# Patient Record
Sex: MALE | Race: WHITE | NOT HISPANIC OR LATINO
[De-identification: names, ages, dates, MRNs, and addresses within clinical notes are randomized per-mention and may not be internally consistent; named-entity substitution may affect disease eponyms.]

---

## 2017-01-04 ENCOUNTER — CHARTING TRANS (OUTPATIENT)
Dept: OTHER | Age: 77
End: 2017-01-04

## 2017-03-06 ENCOUNTER — CHARTING TRANS (OUTPATIENT)
Dept: OTHER | Age: 77
End: 2017-03-06

## 2017-03-16 ENCOUNTER — CHARTING TRANS (OUTPATIENT)
Dept: OTHER | Age: 77
End: 2017-03-16

## 2019-01-10 ENCOUNTER — OFFICE VISIT (OUTPATIENT)
Dept: URBAN - METROPOLITAN AREA CLINIC 18 | Facility: CLINIC | Age: 79
End: 2019-01-10
Payer: COMMERCIAL

## 2019-01-10 DIAGNOSIS — H02.831 DERMATOCHALASIS OF RIGHT UPPER EYELID: ICD-10-CM

## 2019-01-10 DIAGNOSIS — R42 VERTIGO: ICD-10-CM

## 2019-01-10 DIAGNOSIS — H02.834 DERMATOCHALASIS OF LEFT UPPER EYELID: ICD-10-CM

## 2019-01-10 PROCEDURE — 92004 COMPRE OPH EXAM NEW PT 1/>: CPT | Performed by: OPTOMETRIST

## 2019-01-10 RX ORDER — ASPIRIN 81 MG/1
81 MG TABLET, CHEWABLE ORAL
Qty: 0 | Refills: 0 | Status: ACTIVE
Start: 2019-01-10

## 2019-01-10 ASSESSMENT — INTRAOCULAR PRESSURE
OS: 16
OD: 14

## 2019-01-10 ASSESSMENT — KERATOMETRY
OD: 42.63
OS: 234.13

## 2019-01-10 NOTE — IMPRESSION/PLAN
Impression: Dermatochalasis of right upper eyelid: H02.831. Plan: Discussed diagnosis in detail with patient. Advised patient of condition. No treatment is required at this time. Will continue to observe condition and or symptoms.  Pt can schedule appt with Dr Castaneda when he is ready

## 2019-01-10 NOTE — IMPRESSION/PLAN
Impression: Vertigo: R42. Condition: established, worsening. Plan: Discussed condition with pt today. Explained Vertigo does not seem to be associated with eyes. OU look healthy and are doing well. NO double vision noticed on exam today.  Recommend keeping appt with Neurologist

## 2020-01-21 ENCOUNTER — OFFICE VISIT (OUTPATIENT)
Dept: URBAN - METROPOLITAN AREA CLINIC 18 | Facility: CLINIC | Age: 80
End: 2020-01-21
Payer: COMMERCIAL

## 2020-01-21 PROCEDURE — 92014 COMPRE OPH EXAM EST PT 1/>: CPT | Performed by: OPTOMETRIST

## 2020-01-21 ASSESSMENT — INTRAOCULAR PRESSURE
OD: 14
OS: 15

## 2020-01-21 ASSESSMENT — KERATOMETRY
OD: 43.00
OS: 43.25

## 2021-12-16 ENCOUNTER — OFFICE VISIT (OUTPATIENT)
Dept: URBAN - METROPOLITAN AREA CLINIC 18 | Facility: CLINIC | Age: 81
End: 2021-12-16
Payer: COMMERCIAL

## 2021-12-16 DIAGNOSIS — H04.123 TEAR FILM INSUFFICIENCY OF BILATERAL LACRIMAL GLANDS: ICD-10-CM

## 2021-12-16 DIAGNOSIS — H25.13 AGE-RELATED NUCLEAR CATARACT, BILATERAL: Primary | ICD-10-CM

## 2021-12-16 PROCEDURE — 99213 OFFICE O/P EST LOW 20 MIN: CPT | Performed by: OPTOMETRIST

## 2021-12-16 ASSESSMENT — INTRAOCULAR PRESSURE
OS: 16
OD: 15

## 2023-02-08 ENCOUNTER — OFFICE VISIT (OUTPATIENT)
Dept: URBAN - METROPOLITAN AREA CLINIC 18 | Facility: CLINIC | Age: 83
End: 2023-02-08
Payer: COMMERCIAL

## 2023-02-08 DIAGNOSIS — H02.834 DERMATOCHALASIS OF LEFT UPPER EYELID: ICD-10-CM

## 2023-02-08 DIAGNOSIS — H04.123 TEAR FILM INSUFFICIENCY OF BILATERAL LACRIMAL GLANDS: ICD-10-CM

## 2023-02-08 DIAGNOSIS — H02.831 DERMATOCHALASIS OF RIGHT UPPER EYELID: ICD-10-CM

## 2023-02-08 DIAGNOSIS — H25.813 COMBINED FORMS OF AGE-RELATED CATARACT, BILATERAL: Primary | ICD-10-CM

## 2023-02-08 PROCEDURE — 99213 OFFICE O/P EST LOW 20 MIN: CPT | Performed by: OPTOMETRIST

## 2023-02-08 ASSESSMENT — INTRAOCULAR PRESSURE
OS: 12
OD: 10